# Patient Record
Sex: MALE | Race: WHITE | NOT HISPANIC OR LATINO | Employment: OTHER | URBAN - METROPOLITAN AREA
[De-identification: names, ages, dates, MRNs, and addresses within clinical notes are randomized per-mention and may not be internally consistent; named-entity substitution may affect disease eponyms.]

---
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
## 2023-05-17 DIAGNOSIS — F41.1 GENERALIZED ANXIETY DISORDER: ICD-10-CM

## 2023-05-17 RX ORDER — GABAPENTIN 100 MG/1
200 CAPSULE ORAL 2 TIMES DAILY
Qty: 60 CAPSULE | Refills: 0 | OUTPATIENT
Start: 2023-05-17

## 2023-05-17 NOTE — TELEPHONE ENCOUNTER
Routing refill request to provider for review/approval because:  Drug not on the [facility] refill protocol   Previously prescribed by a different provider

## 2023-05-22 NOTE — TELEPHONE ENCOUNTER
Received another fax regarding this refill on 5/22/23. Called pharmacy. Patient is not seen by [facility]. Patient is seen by [facility] clinics.  Patient's pharmacy will contact correct clinic.

## 2024-12-05 ENCOUNTER — TELEPHONE (OUTPATIENT)
Dept: PSYCHOLOGY | Facility: CLINIC | Age: 24
End: 2024-12-05

## 2024-12-05 NOTE — TELEPHONE ENCOUNTER
Received faxed referral from [facility] Dr. Gavino Freed for dx: sexual dysfunctions and hx of porn addiction. Referral was not clear if pt was seeking a sexual medicine consult or therapy (CSB). Writer called pt's mom and guardian Shelli and left DVM asking her to call back to clarify what services are needed and if they would like to schedule.    Per Ravindra Fagan, pt can be scheduled for CSB screening if needed.     Milton Looney on 12/5/2024 at 3:09 PM